# Patient Record
Sex: FEMALE | Race: WHITE
[De-identification: names, ages, dates, MRNs, and addresses within clinical notes are randomized per-mention and may not be internally consistent; named-entity substitution may affect disease eponyms.]

---

## 2019-10-10 ENCOUNTER — HOSPITAL ENCOUNTER (INPATIENT)
Dept: HOSPITAL 95 - OBS | Age: 39
LOS: 2 days | Discharge: HOME | End: 2019-10-12
Attending: ADVANCED PRACTICE MIDWIFE | Admitting: ADVANCED PRACTICE MIDWIFE
Payer: COMMERCIAL

## 2019-10-10 VITALS — HEIGHT: 62.01 IN | BODY MASS INDEX: 34.45 KG/M2 | WEIGHT: 189.6 LBS

## 2019-10-10 DIAGNOSIS — Z3A.38: ICD-10-CM

## 2019-10-10 DIAGNOSIS — F19.10: ICD-10-CM

## 2019-10-10 DIAGNOSIS — F17.200: ICD-10-CM

## 2019-10-10 PROCEDURE — G0480 DRUG TEST DEF 1-7 CLASSES: HCPCS

## 2019-10-11 LAB
AMPHETAMINES UR SCN-MCNC: DETECTED NG/ML
AMPHETAMINES UR-MCNC: DETECTED UG/L
BASOPHILS # BLD AUTO: 0.03 K/MM3 (ref 0–0.23)
BASOPHILS NFR BLD AUTO: 0 % (ref 0–2)
DEPRECATED RDW RBC AUTO: 46.8 FL (ref 35.1–46.3)
EOSINOPHIL # BLD AUTO: 0.26 K/MM3 (ref 0–0.68)
EOSINOPHIL NFR BLD AUTO: 3 % (ref 0–6)
ERYTHROCYTE [DISTWIDTH] IN BLOOD BY AUTOMATED COUNT: 15.4 % (ref 11.7–14.2)
HCT VFR BLD AUTO: 30.1 % (ref 33–51)
HGB BLD-MCNC: 9.4 G/DL (ref 11.5–16)
IMM GRANULOCYTES # BLD AUTO: 0.08 K/MM3 (ref 0–0.1)
IMM GRANULOCYTES NFR BLD AUTO: 1 % (ref 0–1)
LYMPHOCYTES # BLD AUTO: 2.57 K/MM3 (ref 0.84–5.2)
LYMPHOCYTES NFR BLD AUTO: 25 % (ref 21–46)
MCHC RBC AUTO-ENTMCNC: 31.2 G/DL (ref 31.5–36.5)
MCV RBC AUTO: 83 FL (ref 80–100)
MONOCYTES # BLD AUTO: 0.49 K/MM3 (ref 0.16–1.47)
MONOCYTES NFR BLD AUTO: 5 % (ref 4–13)
NEUTROPHILS # BLD AUTO: 6.77 K/MM3 (ref 1.96–9.15)
NEUTROPHILS NFR BLD AUTO: 66 % (ref 41–73)
NRBC # BLD AUTO: 0 K/MM3 (ref 0–0.02)
NRBC BLD AUTO-RTO: 0 /100 WBC (ref 0–0.2)
PLATELET # BLD AUTO: 432 K/MM3 (ref 150–400)

## 2019-10-11 PROCEDURE — 3E0R3BZ INTRODUCTION OF ANESTHETIC AGENT INTO SPINAL CANAL, PERCUTANEOUS APPROACH: ICD-10-PCS | Performed by: ANESTHESIOLOGY

## 2019-10-11 PROCEDURE — 3E033VJ INTRODUCTION OF OTHER HORMONE INTO PERIPHERAL VEIN, PERCUTANEOUS APPROACH: ICD-10-PCS | Performed by: FAMILY MEDICINE

## 2019-10-11 NOTE — NUR
When I first came on to shift, baby was skin to skin with mom breastfeeding.
after about 20 minutes I asked if she would like me to bathe the baby and she
said "Yes I think she would like that." As I was bathing her, [place] turned her
back towards the baby and I and fell asleep soundly snoring. There was no
engagement with her baby or me bathing her. After the bath and the security
bands were placed, [place] was difficult to arouse. Once I woke her up, I told
her that I swaddled [place] and put her in her crib. Shortly after Ignacio and
I returned to the room to get her up so she could shower and she was difficult
to arouse again, once she was out of the shower she immedidately asked if she
could go outside and smoke. I notified her of her bleeding risks and she said
she would still like to go outside. IV removed. Baby was then up at the desk
with nursing staff while pt was outside for almost 1 hour with sister and
mother carter at side. Will monitor interactions throughout the night.

## 2019-10-11 NOTE — NUR
pt out to smoke with mother and sister. iv removed. pt warned of bleeding
concerns and decides to leave anyway. baby at desk.

## 2019-10-12 LAB
BASOPHILS # BLD AUTO: 0.04 K/MM3 (ref 0–0.23)
BASOPHILS NFR BLD AUTO: 0 % (ref 0–2)
DEPRECATED RDW RBC AUTO: 46.7 FL (ref 35.1–46.3)
EOSINOPHIL # BLD AUTO: 0.25 K/MM3 (ref 0–0.68)
EOSINOPHIL NFR BLD AUTO: 2 % (ref 0–6)
ERYTHROCYTE [DISTWIDTH] IN BLOOD BY AUTOMATED COUNT: 15.4 % (ref 11.7–14.2)
HCT VFR BLD AUTO: 28.9 % (ref 33–51)
HGB BLD-MCNC: 8.9 G/DL (ref 11.5–16)
IMM GRANULOCYTES # BLD AUTO: 0.12 K/MM3 (ref 0–0.1)
IMM GRANULOCYTES NFR BLD AUTO: 1 % (ref 0–1)
LYMPHOCYTES # BLD AUTO: 3.32 K/MM3 (ref 0.84–5.2)
LYMPHOCYTES NFR BLD AUTO: 23 % (ref 21–46)
MCHC RBC AUTO-ENTMCNC: 30.8 G/DL (ref 31.5–36.5)
MCV RBC AUTO: 83 FL (ref 80–100)
MONOCYTES # BLD AUTO: 0.66 K/MM3 (ref 0.16–1.47)
MONOCYTES NFR BLD AUTO: 5 % (ref 4–13)
NEUTROPHILS # BLD AUTO: 10.34 K/MM3 (ref 1.96–9.15)
NEUTROPHILS NFR BLD AUTO: 70 % (ref 41–73)
NRBC # BLD AUTO: 0.05 K/MM3 (ref 0–0.02)
NRBC BLD AUTO-RTO: 0.3 /100 WBC (ref 0–0.2)
PLATELET # BLD AUTO: 346 K/MM3 (ref 150–400)

## 2019-10-12 NOTE — NUR
PT DIFFICULT TO WAKE UP THIS MORNING FOR BREAKFAST AND TO FEED BABY. NB
SHOWING EARLY HUNGER CUES, SMACKING LIPS AND THRUSTING TONGUE. i EXPLAINED
THIS TO THE MOTHER AND GRANDMOTHER THAT WHEN THEY SEE BABY DO THIS IT IS TIME
TO FEED EVEN IF SHE IS NOT CRYING. THE MOTHER JUST SAID TO GIVE THE BABY THE
BOTTLE. I ASKED THE PATIENT IF SHE WANTED HER MOTHER TO FEED THE BABY WITH THE
BOTTLE AND SHE SAID YES. THE GRANDMOTHER DIDNT KNOW HOW TO PUT THE NIPPLE ON
THE BOTTLE, SO I PREPARED THE BOTTLE AND EXPLAINED TO THE GRANDMOTHER THAT I
WAS POURING OUT HALF SO THE BABY WOULDNT OVER EAT. AND SHOWED HER HOW TO
BOTTLE FEED THE